# Patient Record
Sex: FEMALE | Race: BLACK OR AFRICAN AMERICAN
[De-identification: names, ages, dates, MRNs, and addresses within clinical notes are randomized per-mention and may not be internally consistent; named-entity substitution may affect disease eponyms.]

---

## 2018-03-16 ENCOUNTER — HOSPITAL ENCOUNTER (EMERGENCY)
Dept: HOSPITAL 58 - ED | Age: 23
Discharge: HOME | End: 2018-03-16

## 2018-03-16 VITALS — TEMPERATURE: 97.8 F | SYSTOLIC BLOOD PRESSURE: 125 MMHG | DIASTOLIC BLOOD PRESSURE: 79 MMHG

## 2018-03-16 VITALS — BODY MASS INDEX: 21.6 KG/M2

## 2018-03-16 DIAGNOSIS — F17.210: ICD-10-CM

## 2018-03-16 DIAGNOSIS — N91.2: ICD-10-CM

## 2018-03-16 DIAGNOSIS — R31.9: ICD-10-CM

## 2018-03-16 DIAGNOSIS — R10.84: Primary | ICD-10-CM

## 2018-03-16 PROCEDURE — 87502 INFLUENZA DNA AMP PROBE: CPT

## 2018-03-16 PROCEDURE — 82150 ASSAY OF AMYLASE: CPT

## 2018-03-16 PROCEDURE — 85025 COMPLETE CBC W/AUTO DIFF WBC: CPT

## 2018-03-16 PROCEDURE — 99283 EMERGENCY DEPT VISIT LOW MDM: CPT

## 2018-03-16 PROCEDURE — 81001 URINALYSIS AUTO W/SCOPE: CPT

## 2018-03-16 PROCEDURE — 84703 CHORIONIC GONADOTROPIN ASSAY: CPT

## 2018-03-16 PROCEDURE — 80053 COMPREHEN METABOLIC PANEL: CPT

## 2018-03-16 PROCEDURE — 87651 STREP A DNA AMP PROBE: CPT

## 2018-03-16 PROCEDURE — 83690 ASSAY OF LIPASE: CPT

## 2018-03-16 PROCEDURE — 36415 COLL VENOUS BLD VENIPUNCTURE: CPT

## 2018-03-16 PROCEDURE — 87086 URINE CULTURE/COLONY COUNT: CPT

## 2018-03-16 NOTE — CT
EXAM:  CT Abdomen without contrast.  CT Pelvis without contrast. 

  

HISTORY:  Lower abdominal pain and vomiting. 

  

COMPARISON:  None available. 

  

TECHNIQUE:  Multiple axial images of the abdomen and pelvis were obtained without intravenous contras
t.  Images were reformatted in the sagittal and coronal plane. 

  

  

FINDINGS:  Please note that evaluation of the abdominal and pelvic structures is limited due to lack 
of intravenous contrast. 

  

The lung bases are clear.  No acute osseous abnormality identified. 

  

The liver, gallbladder, pancreas, spleen, and adrenal glands demonstrate normal contour.  Multiple bi
lateral nonobstructing renal calculi are seen measuring up to 0.2 cm on the left and 0.3 cm on the ri
ght.  No hydronephrosis or perinephric inflammation identified.  No ureteral or bladder calculi are s
een. 

  

The bowel is normal in course and caliber without evidence for obstruction or inflammatory process.  
The appendix is not seen with certainty.  No pericecal inflammation identified.  Uterus demonstrates 
normal contour.  Bladder is not well distended.  Trace amount of free pelvic fluid noted.  No free ai
r identified. 

  

---------------------------- 

IMPRESSION: 

1.  Bilateral nephrolithiasis without obstructive uropathy. 

2.  Nonvisualized appendix.

## 2018-03-16 NOTE — ED.PDOC
General


ED Provider: 


Dr. KAREN HERRERA





Chief Complaint: Abdominal Pain


Stated Complaint: ABDOMINAL PAIN


Time Seen by Physician: 11:00


Mode of Arrival: Walk-In


Information Source: Patient


Exam Limitations: No limitations


[facility] 


[facility]





Nursing and Triage Documentation Reviewed and Agree: Yes


Reviewed sepsis parameters & appropriate labs ordered?: Yes


System Inflammatory Response Syndrome: Not Applicable


Sepsis Protocol: 


For patient's 13 years and over:





Temp is 96.8 and below  and greater


Pulse >90 BPM


Resp >20/minute


Acutely Altered Mental Status





Are patient's symptoms suggestive of a new infection, such as:


   -Pneumonia


   -Skin, Soft Tissue


   -Endocarditis


   -UTI


   -Bone, Joint Infection


   -Implantable Device


   -Acute Abdominal Infection


   -Wound Infection


   -Meningitis


   -Blood Stream Catheter Infection


   -Unknown





System Inflammatory Response Syndrome: Not Applicable





GI Complaint Exam





- Abdominal Pain Complaint/Exam


Onset: Gradual


Duration:  2 MONTHS 


Symptoms Are: Still present


Timing: Intermittent


Initial Severity: Mild


Current Severity: Mild


Location of Pain: Diffuse


Character: Reports: Dull, Aching


Aggravating: Reports: None


Alleviating: Reports: None


Associated Signs and Symptoms: Reports: Cough.  Denies: Diaphoresis, Fever, 

Chest pain, Dizziness, Back pain, Constipation, Blood in stool, Dysuria, 

Urinary frequency, Decreased urine output, Decreased appetite, Vaginal bleeding

, Vaginal discharge, Nausea, Vomiting, Diarrhea, Sore throat, Decreased activity


Related History: Reports: Similar episode


AAA Risk Factors: Reports: None


Cardiac Risk Factors: Reports: None


Ectopic Pregnancy Risk Factors: Reports: None


Ovarian Torsion Risk Factors: Reports: None


Surgical Obstruction Risk Factors: Reports: None


Related Surgical History: Reports: None


Patient Rh Status: Unknown


Abdominal Findings: Present: None


Female Body Picture: 


  __________________________














  __________________________





 1 - MILD PAIN , NO REBOUND NO BRUSING





Differential Diagnoses: Appendicitis, Bowel Obstruction, Pancreatitis, UTI


Quality Indicators for AMI: EKG in 10min.


Quality Indicators for Cardiac Chest Pain: EKG in 10min.


Quality Indicator For Non-Traumatic Chest Pain/Syncope: EKG Performed





Review of Systems





- Review Of Systems


Constitutional: Reports: No symptoms


Eyes: Reports: No symptoms


Ears, Nose, Mouth, Throat: Reports: No symptoms


Respiratory: Reports: No symptoms


Cardiac: Reports: No symptoms


GI: Reports: Abdominal pain


: Reports: Other (NO PERIODS SINCE DECEMBER )


Musculoskeletal: Reports: No symptoms


Skin: Reports: No symptoms


Neurological: Reports: No symptoms


Endocrine: Reports: No symptoms


Hematologic/Lymphatic: Reports: No symptoms


All Other Systems: Reviewed and Negative





Past Medical History





- Past Medical History


Previously Healthy: Yes


Endocrine: Reports: None


Cardiovascular: Reports: None


Respiratory: Reports: None


Hematological: Reports: None


Gastrointestinal: Reports: None


Genitourinary: Reports: None


Neuro/Psych: Reports: None


Musculoskeletal: Reports: None


Cancer: Reports: None


Last Menstrual Period: end of december





- Surgical History


General Surgical History: Reports: Unknown





- Family History


Family History: Reports: Unknown





- Social History


Smoking Status: Current every day smoker, Light tobacco smoker


Hx Substance Use: No


Alcohol Screening: None





Physical Exam





- Physical Exam


Appearance: Well-appearing, No pain distress, Well-nourished


Eyes: ANN, EOMI, Conjunctiva clear


ENT: Ears normal, Nose normal, Oropharynx normal


Respiratory: Airway patent, Breath sounds clear, Breath sounds equal, 

Respirations nonlabored


Cardiovascular: RRR, Pulses normal, No rub, No murmur


GI/: Soft, Nontender, No masses, Bowel sounds normal, No Organomegaly


Musculoskeletal: Normal strength, ROM intact, No edema, No calf tenderness


Skin: Warm, Dry, Normal color


Neurological: Sensation intact, Motor intact, Reflexes intact, Cranial nerves 

intact, Alert, Oriented


Psychiatric: Affect appropriate, Mood appropriate





Critical Care Note





- Critical Care Note


Total Time (mins): 0





Course





- Course


Hematology/Chemistry: 


 03/16/18 11:20





 03/16/18 11:20


Orders, Labs, Meds: 





Lab Review











  03/16/18 03/16/18 03/16/18





  11:20 11:20 11:20


 


WBC  12.33 H  


 


RBC  4.85  


 


Hgb  14.9  


 


Hct  43.3  


 


MCV  89.3  


 


MCH  30.7  


 


MCHC  34.4  


 


RDW Coeff of Omi  14.1  


 


Plt Count  277  


 


Immature Gran % (Auto)  0.3  


 


Neut % (Auto)  75.2  


 


Lymph % (Auto)  16.1  


 


Mono % (Auto)  6.5  


 


Eos % (Auto)  1.5  


 


Baso % (Auto)  0.4  


 


Immature Gran # (Auto)  0.0  


 


Neut # (Auto)  9.3 H  


 


Lymph # (Auto)  2.0  


 


Mono # (Auto)  0.8  


 


Eos # (Auto)  0.2  


 


Baso # (Auto)  0.1  


 


Sodium   139 


 


Potassium   3.6 


 


Chloride   105 


 


Carbon Dioxide   24 


 


Anion Gap   13.6 


 


BUN   6 L 


 


Creatinine   0.68 


 


Estimated GFR (MDRD)   130.00 


 


BUN/Creatinine Ratio   8.82 


 


Glucose   90 


 


Calcium   9.6 


 


Total Bilirubin   0.6 


 


AST   14 L 


 


ALT   13 


 


Alkaline Phosphatase   88 


 


Total Protein   7.8 


 


Albumin   4.1 


 


Globulin   3.7 


 


Albumin/Globulin Ratio   1.11 


 


Amylase   105 


 


Lipase   17 


 


Serum Pregnancy, Qual    Negative


 


Urine Color   


 


Urine Clarity   


 


Urine pH   


 


Ur Specific Gravity   


 


Urine Protein   


 


Urine Glucose (UA)   


 


Urine Ketones   


 


Urine Blood   


 


Urine Nitrite   


 


Urine Bilirubin   


 


Urine Urobilinogen   


 


Ur Leukocyte Esterase   


 


Urine Microscopic RBC   


 


Urine Microscopic WBC   


 


Ur Squamous Epith Cells   


 


Urine Mucus   


 


Influ A Molecular Assay   


 


Influ B Molecular Assay   














  03/16/18 03/16/18





  11:20 11:40


 


WBC  


 


RBC  


 


Hgb  


 


Hct  


 


MCV  


 


MCH  


 


MCHC  


 


RDW Coeff of Omi  


 


Plt Count  


 


Immature Gran % (Auto)  


 


Neut % (Auto)  


 


Lymph % (Auto)  


 


Mono % (Auto)  


 


Eos % (Auto)  


 


Baso % (Auto)  


 


Immature Gran # (Auto)  


 


Neut # (Auto)  


 


Lymph # (Auto)  


 


Mono # (Auto)  


 


Eos # (Auto)  


 


Baso # (Auto)  


 


Sodium  


 


Potassium  


 


Chloride  


 


Carbon Dioxide  


 


Anion Gap  


 


BUN  


 


Creatinine  


 


Estimated GFR (MDRD)  


 


BUN/Creatinine Ratio  


 


Glucose  


 


Calcium  


 


Total Bilirubin  


 


AST  


 


ALT  


 


Alkaline Phosphatase  


 


Total Protein  


 


Albumin  


 


Globulin  


 


Albumin/Globulin Ratio  


 


Amylase  


 


Lipase  


 


Serum Pregnancy, Qual  


 


Urine Color  Yellow 


 


Urine Clarity  Clear 


 


Urine pH  7.0 


 


Ur Specific Gravity  1.025 


 


Urine Protein  Trace 


 


Urine Glucose (UA)  Negative 


 


Urine Ketones  Negative 


 


Urine Blood  2+ 


 


Urine Nitrite  Negative 


 


Urine Bilirubin  Negative 


 


Urine Urobilinogen  1.0 


 


Ur Leukocyte Esterase  1+ 


 


Urine Microscopic RBC  10-20 


 


Urine Microscopic WBC  20-30 


 


Ur Squamous Epith Cells  10-20 


 


Urine Mucus  2+ 


 


Influ A Molecular Assay   Negative by naat


 


Influ B Molecular Assay   Negative by naat








Orders











 Category Date Time Status


 


 AMYLASE Stat LAB  03/16/18 11:20 Completed


 


 CBC W/ AUTO DIFF Stat LAB  03/16/18 11:20 Completed


 


 COMPREHENSIVE METABOLIC PANEL Stat LAB  03/16/18 11:20 Completed


 


 FLU A/B MOLECULAR Stat LAB  03/16/18 11:40 Completed


 


 LIPASE Stat LAB  03/16/18 11:20 Completed


 


 MOLECULAR GROUP A STREP Stat LAB  03/16/18 11:40 Completed


 


 SERUM PREGNANCY Stat LAB  03/16/18 11:20 Completed


 


 URINALYSIS C & S IF INDICATED Stat LAB  03/16/18 11:20 Completed


 


 URINE CULTURE Routine LAB  03/16/18 11:15 Received


 


 CT ABDOMEN/PELVIS WO CONTRAST Stat RADS  03/16/18 11:18 Ordered











Vital Signs: 





 











  Temp Pulse Resp BP Pulse Ox


 


 03/16/18 10:54  97.8 F  80  20  125/79  98














Departure





- Departure


Time of Disposition: 13:00


Disposition: HOME SELF-CARE


Discharge Problem: 


 Abdominal pain, Amenorrhea





Abdominal pain


Qualifiers:


 Abdominal location: generalized Qualified Code(s): R10.84 - Generalized 

abdominal pain





Instructions:  Abdominal Pain (ED)


Condition: Good


Pt referred to PMD for follow-up: Yes


IPMP verified?: No


Additional Instructions: 


Please call your Family Physician as soon as possible to schedule a follow-up 

appointment.


Allergies/Adverse Reactions: 


Allergies





No Known Allergies Allergy (Verified 03/16/18 11:02)


 








Home Medications: 


Ambulatory Orders





1 [No Reported Medications]  03/16/18

## 2018-06-16 ENCOUNTER — HOSPITAL ENCOUNTER (EMERGENCY)
Dept: HOSPITAL 58 - ED | Age: 23
Discharge: LEFT BEFORE DISCHARGE | End: 2018-06-16
Payer: COMMERCIAL

## 2018-06-16 VITALS — SYSTOLIC BLOOD PRESSURE: 112 MMHG | DIASTOLIC BLOOD PRESSURE: 73 MMHG | TEMPERATURE: 98.2 F

## 2018-06-16 VITALS — BODY MASS INDEX: 20.9 KG/M2

## 2018-06-16 DIAGNOSIS — J02.9: ICD-10-CM

## 2018-06-16 DIAGNOSIS — R50.9: ICD-10-CM

## 2018-06-16 DIAGNOSIS — R51: Primary | ICD-10-CM
